# Patient Record
Sex: FEMALE | Race: WHITE | URBAN - METROPOLITAN AREA
[De-identification: names, ages, dates, MRNs, and addresses within clinical notes are randomized per-mention and may not be internally consistent; named-entity substitution may affect disease eponyms.]

---

## 2018-11-20 ENCOUNTER — OUTPATIENT (OUTPATIENT)
Dept: OUTPATIENT SERVICES | Facility: HOSPITAL | Age: 33
LOS: 1 days | End: 2018-11-20
Payer: COMMERCIAL

## 2018-11-20 ENCOUNTER — TRANSCRIPTION ENCOUNTER (OUTPATIENT)
Age: 33
End: 2018-11-20

## 2018-11-20 VITALS
SYSTOLIC BLOOD PRESSURE: 119 MMHG | OXYGEN SATURATION: 98 % | TEMPERATURE: 98 F | HEART RATE: 89 BPM | RESPIRATION RATE: 20 BRPM | DIASTOLIC BLOOD PRESSURE: 72 MMHG | WEIGHT: 188.05 LBS | HEIGHT: 63 IN

## 2018-11-20 DIAGNOSIS — O02.1 MISSED ABORTION: ICD-10-CM

## 2018-11-20 DIAGNOSIS — Z90.89 ACQUIRED ABSENCE OF OTHER ORGANS: Chronic | ICD-10-CM

## 2018-11-20 DIAGNOSIS — Z98.890 OTHER SPECIFIED POSTPROCEDURAL STATES: Chronic | ICD-10-CM

## 2018-11-20 LAB
BLD GP AB SCN SERPL QL: NEGATIVE — SIGNIFICANT CHANGE UP
HCT VFR BLD CALC: 39.4 % — SIGNIFICANT CHANGE UP (ref 34.5–45)
HGB BLD-MCNC: 13.2 G/DL — SIGNIFICANT CHANGE UP (ref 11.5–15.5)
MCHC RBC-ENTMCNC: 29.9 PG — SIGNIFICANT CHANGE UP (ref 27–34)
MCHC RBC-ENTMCNC: 33.5 GM/DL — SIGNIFICANT CHANGE UP (ref 32–36)
MCV RBC AUTO: 89.1 FL — SIGNIFICANT CHANGE UP (ref 80–100)
PLATELET # BLD AUTO: 294 K/UL — SIGNIFICANT CHANGE UP (ref 150–400)
RBC # BLD: 4.42 M/UL — SIGNIFICANT CHANGE UP (ref 3.8–5.2)
RBC # FLD: 13.5 % — SIGNIFICANT CHANGE UP (ref 10.3–14.5)
RH IG SCN BLD-IMP: POSITIVE — SIGNIFICANT CHANGE UP
WBC # BLD: 8.76 K/UL — SIGNIFICANT CHANGE UP (ref 3.8–10.5)
WBC # FLD AUTO: 8.76 K/UL — SIGNIFICANT CHANGE UP (ref 3.8–10.5)

## 2018-11-20 PROCEDURE — 86901 BLOOD TYPING SEROLOGIC RH(D): CPT

## 2018-11-20 PROCEDURE — G0463: CPT

## 2018-11-20 PROCEDURE — 85027 COMPLETE CBC AUTOMATED: CPT

## 2018-11-20 PROCEDURE — 86850 RBC ANTIBODY SCREEN: CPT

## 2018-11-20 PROCEDURE — 86900 BLOOD TYPING SEROLOGIC ABO: CPT

## 2018-11-20 RX ORDER — LIDOCAINE HCL 20 MG/ML
0.2 VIAL (ML) INJECTION ONCE
Qty: 0 | Refills: 0 | Status: DISCONTINUED | OUTPATIENT
Start: 2018-11-21 | End: 2018-12-06

## 2018-11-20 RX ORDER — SODIUM CHLORIDE 9 MG/ML
3 INJECTION INTRAMUSCULAR; INTRAVENOUS; SUBCUTANEOUS EVERY 8 HOURS
Qty: 0 | Refills: 0 | Status: DISCONTINUED | OUTPATIENT
Start: 2018-11-21 | End: 2018-12-06

## 2018-11-20 NOTE — H&P PST ADULT - RS GEN PE MLT RESP DETAILS PC
breath sounds equal/normal/airway patent/respirations non-labored/good air movement/clear to auscultation bilaterally

## 2018-11-20 NOTE — H&P PST ADULT - NSANTHOSAYNRD_GEN_A_CORE
No. SHAHZAD screening performed.  STOP BANG Legend: 0-2 = LOW Risk; 3-4 = INTERMEDIATE Risk; 5-8 = HIGH Risk

## 2018-11-20 NOTE — H&P PST ADULT - HISTORY OF PRESENT ILLNESS
33 yr old female  , 11.5 weeks pregnant , had sonogram that revealed no heart beat , Now coming in for D & C Suction for Missed  on 18.

## 2018-11-21 ENCOUNTER — RESULT REVIEW (OUTPATIENT)
Age: 33
End: 2018-11-21

## 2018-11-21 ENCOUNTER — OUTPATIENT (OUTPATIENT)
Dept: OUTPATIENT SERVICES | Facility: HOSPITAL | Age: 33
LOS: 1 days | End: 2018-11-21
Payer: COMMERCIAL

## 2018-11-21 VITALS
RESPIRATION RATE: 16 BRPM | HEART RATE: 85 BPM | TEMPERATURE: 99 F | DIASTOLIC BLOOD PRESSURE: 69 MMHG | OXYGEN SATURATION: 97 % | SYSTOLIC BLOOD PRESSURE: 117 MMHG

## 2018-11-21 VITALS
HEIGHT: 63 IN | OXYGEN SATURATION: 98 % | HEART RATE: 98 BPM | RESPIRATION RATE: 20 BRPM | DIASTOLIC BLOOD PRESSURE: 79 MMHG | SYSTOLIC BLOOD PRESSURE: 119 MMHG | WEIGHT: 188.05 LBS | TEMPERATURE: 99 F

## 2018-11-21 DIAGNOSIS — Z98.890 OTHER SPECIFIED POSTPROCEDURAL STATES: Chronic | ICD-10-CM

## 2018-11-21 DIAGNOSIS — O02.1 MISSED ABORTION: ICD-10-CM

## 2018-11-21 DIAGNOSIS — Z90.89 ACQUIRED ABSENCE OF OTHER ORGANS: Chronic | ICD-10-CM

## 2018-11-21 PROCEDURE — 88305 TISSUE EXAM BY PATHOLOGIST: CPT

## 2018-11-21 PROCEDURE — 59820 CARE OF MISCARRIAGE: CPT

## 2018-11-21 PROCEDURE — 88305 TISSUE EXAM BY PATHOLOGIST: CPT | Mod: 26

## 2018-11-21 RX ORDER — ONDANSETRON 8 MG/1
4 TABLET, FILM COATED ORAL ONCE
Qty: 0 | Refills: 0 | Status: DISCONTINUED | OUTPATIENT
Start: 2018-11-21 | End: 2018-12-06

## 2018-11-21 RX ORDER — SODIUM CHLORIDE 9 MG/ML
1000 INJECTION, SOLUTION INTRAVENOUS
Qty: 0 | Refills: 0 | Status: DISCONTINUED | OUTPATIENT
Start: 2018-11-21 | End: 2018-12-06

## 2018-11-21 RX ORDER — CELECOXIB 200 MG/1
200 CAPSULE ORAL ONCE
Qty: 0 | Refills: 0 | Status: DISCONTINUED | OUTPATIENT
Start: 2018-11-21 | End: 2018-12-06

## 2018-11-21 RX ORDER — ACETAMINOPHEN 500 MG
1000 TABLET ORAL ONCE
Qty: 0 | Refills: 0 | Status: COMPLETED | OUTPATIENT
Start: 2018-11-21 | End: 2018-11-21

## 2018-11-21 RX ORDER — CELECOXIB 200 MG/1
200 CAPSULE ORAL ONCE
Qty: 0 | Refills: 0 | Status: COMPLETED | OUTPATIENT
Start: 2018-11-21 | End: 2018-11-21

## 2018-11-21 NOTE — BRIEF OPERATIVE NOTE - PROCEDURE
<<-----Click on this checkbox to enter Procedure Suction curettage of uterus  11/21/2018    Active  RAYSHAWN

## 2018-11-21 NOTE — BRIEF OPERATIVE NOTE - COMMENTS
Pt. is a smoker and was bucking during procedure. Once patient started to cook all instruments were immediately removed from uterus. Procedure was continued once pt. was better sedated. Pt. is a smoker and was bucking during procedure. Once patient started to cook all instruments were immediately removed from uterus. Procedure was continued once pt. was better sedated.    Dictation #: [id number]

## 2018-11-21 NOTE — BRIEF OPERATIVE NOTE - OPERATION/FINDINGS
Anteverted 9wk sized uterus. Dilated cervix to 0.5cm prior to starting procedure. Products of conception were removed without difficulty.

## 2018-11-21 NOTE — ASU DISCHARGE PLAN (ADULT/PEDIATRIC). - MEDICATION SUMMARY - MEDICATIONS TO TAKE
I will START or STAY ON the medications listed below when I get home from the hospital:    Tylenol 325 mg oral tablet  -- 2 tab(s) by mouth every 4 hours  -- Indication: For Pain    ibuprofen 600 mg oral tablet  -- 1 tab(s) by mouth every 6 hours  -- Indication: For Pain

## 2018-11-21 NOTE — ASU DISCHARGE PLAN (ADULT/PEDIATRIC). - NOTIFY
Numbness, color, or temperature change to extremity/Unable to Urinate/Excessive Diarrhea/Bleeding that does not stop/Persistent Nausea and Vomiting/GYN Fever>100.4/Increased Irritability or Sluggishness/Swelling that continues/Pain not relieved by Medications/Numbness, tingling/Inability to Tolerate Liquids or Foods

## 2018-11-23 LAB — SURGICAL PATHOLOGY STUDY: SIGNIFICANT CHANGE UP

## 2019-04-28 ENCOUNTER — EMERGENCY (EMERGENCY)
Facility: HOSPITAL | Age: 34
LOS: 1 days | Discharge: HOME | End: 2019-04-28
Attending: EMERGENCY MEDICINE
Payer: COMMERCIAL

## 2019-04-28 VITALS
HEART RATE: 86 BPM | OXYGEN SATURATION: 97 % | TEMPERATURE: 98 F | SYSTOLIC BLOOD PRESSURE: 128 MMHG | DIASTOLIC BLOOD PRESSURE: 88 MMHG | RESPIRATION RATE: 16 BRPM

## 2019-04-28 VITALS
SYSTOLIC BLOOD PRESSURE: 116 MMHG | OXYGEN SATURATION: 97 % | HEART RATE: 101 BPM | TEMPERATURE: 98 F | HEIGHT: 62 IN | DIASTOLIC BLOOD PRESSURE: 77 MMHG | RESPIRATION RATE: 20 BRPM | WEIGHT: 179.9 LBS

## 2019-04-28 DIAGNOSIS — Z90.89 ACQUIRED ABSENCE OF OTHER ORGANS: Chronic | ICD-10-CM

## 2019-04-28 DIAGNOSIS — Z98.890 OTHER SPECIFIED POSTPROCEDURAL STATES: Chronic | ICD-10-CM

## 2019-04-28 LAB
ALBUMIN SERPL ELPH-MCNC: 4 G/DL — SIGNIFICANT CHANGE UP (ref 3.3–5)
ALP SERPL-CCNC: 34 U/L — LOW (ref 40–120)
ALT FLD-CCNC: 24 U/L — SIGNIFICANT CHANGE UP (ref 10–45)
ANION GAP SERPL CALC-SCNC: 13 MMOL/L — SIGNIFICANT CHANGE UP (ref 5–17)
APPEARANCE UR: ABNORMAL
AST SERPL-CCNC: 10 U/L — SIGNIFICANT CHANGE UP (ref 10–40)
BACTERIA # UR AUTO: ABNORMAL
BASOPHILS # BLD AUTO: 0 K/UL — SIGNIFICANT CHANGE UP (ref 0–0.2)
BASOPHILS NFR BLD AUTO: 0.3 % — SIGNIFICANT CHANGE UP (ref 0–2)
BILIRUB SERPL-MCNC: 0.1 MG/DL — LOW (ref 0.2–1.2)
BILIRUB UR-MCNC: NEGATIVE — SIGNIFICANT CHANGE UP
BLD GP AB SCN SERPL QL: NEGATIVE — SIGNIFICANT CHANGE UP
BUN SERPL-MCNC: 6 MG/DL — LOW (ref 7–23)
CALCIUM SERPL-MCNC: 9.4 MG/DL — SIGNIFICANT CHANGE UP (ref 8.4–10.5)
CHLORIDE SERPL-SCNC: 100 MMOL/L — SIGNIFICANT CHANGE UP (ref 96–108)
CO2 SERPL-SCNC: 22 MMOL/L — SIGNIFICANT CHANGE UP (ref 22–31)
COD CRY URNS QL: ABNORMAL
COLOR SPEC: YELLOW — SIGNIFICANT CHANGE UP
COMMENT - URINE: SIGNIFICANT CHANGE UP
CREAT SERPL-MCNC: 0.37 MG/DL — LOW (ref 0.5–1.3)
DIFF PNL FLD: NEGATIVE — SIGNIFICANT CHANGE UP
EOSINOPHIL # BLD AUTO: 0.1 K/UL — SIGNIFICANT CHANGE UP (ref 0–0.5)
EOSINOPHIL NFR BLD AUTO: 1.4 % — SIGNIFICANT CHANGE UP (ref 0–6)
EPI CELLS # UR: 13 — HIGH
GLUCOSE SERPL-MCNC: 85 MG/DL — SIGNIFICANT CHANGE UP (ref 70–99)
GLUCOSE UR QL: NEGATIVE — SIGNIFICANT CHANGE UP
HCG SERPL-ACNC: HIGH MIU/ML
HCT VFR BLD CALC: 43.3 % — SIGNIFICANT CHANGE UP (ref 34.5–45)
HGB BLD-MCNC: 13.9 G/DL — SIGNIFICANT CHANGE UP (ref 11.5–15.5)
HYALINE CASTS # UR AUTO: 2 /LPF — SIGNIFICANT CHANGE UP (ref 0–7)
KETONES UR-MCNC: NEGATIVE — SIGNIFICANT CHANGE UP
LEUKOCYTE ESTERASE UR-ACNC: ABNORMAL
LYMPHOCYTES # BLD AUTO: 1.6 K/UL — SIGNIFICANT CHANGE UP (ref 1–3.3)
LYMPHOCYTES # BLD AUTO: 17.9 % — SIGNIFICANT CHANGE UP (ref 13–44)
MCHC RBC-ENTMCNC: 29.1 PG — SIGNIFICANT CHANGE UP (ref 27–34)
MCHC RBC-ENTMCNC: 32.2 GM/DL — SIGNIFICANT CHANGE UP (ref 32–36)
MCV RBC AUTO: 90.5 FL — SIGNIFICANT CHANGE UP (ref 80–100)
MONOCYTES # BLD AUTO: 0.6 K/UL — SIGNIFICANT CHANGE UP (ref 0–0.9)
MONOCYTES NFR BLD AUTO: 7.3 % — SIGNIFICANT CHANGE UP (ref 2–14)
NEUTROPHILS # BLD AUTO: 6.3 K/UL — SIGNIFICANT CHANGE UP (ref 1.8–7.4)
NEUTROPHILS NFR BLD AUTO: 73.2 % — SIGNIFICANT CHANGE UP (ref 43–77)
NITRITE UR-MCNC: NEGATIVE — SIGNIFICANT CHANGE UP
PH UR: 6.5 — SIGNIFICANT CHANGE UP (ref 5–8)
PLATELET # BLD AUTO: 287 K/UL — SIGNIFICANT CHANGE UP (ref 150–400)
POTASSIUM SERPL-MCNC: 3.9 MMOL/L — SIGNIFICANT CHANGE UP (ref 3.5–5.3)
POTASSIUM SERPL-SCNC: 3.9 MMOL/L — SIGNIFICANT CHANGE UP (ref 3.5–5.3)
PROT SERPL-MCNC: 6.9 G/DL — SIGNIFICANT CHANGE UP (ref 6–8.3)
PROT UR-MCNC: ABNORMAL
RBC # BLD: 4.79 M/UL — SIGNIFICANT CHANGE UP (ref 3.8–5.2)
RBC # FLD: 12.5 % — SIGNIFICANT CHANGE UP (ref 10.3–14.5)
RBC CASTS # UR COMP ASSIST: 1 /HPF — SIGNIFICANT CHANGE UP (ref 0–4)
RH IG SCN BLD-IMP: POSITIVE — SIGNIFICANT CHANGE UP
SODIUM SERPL-SCNC: 135 MMOL/L — SIGNIFICANT CHANGE UP (ref 135–145)
SP GR SPEC: 1.03 — HIGH (ref 1.01–1.02)
UROBILINOGEN FLD QL: NEGATIVE — SIGNIFICANT CHANGE UP
WBC # BLD: 8.7 K/UL — SIGNIFICANT CHANGE UP (ref 3.8–10.5)
WBC # FLD AUTO: 8.7 K/UL — SIGNIFICANT CHANGE UP (ref 3.8–10.5)
WBC UR QL: 140 /HPF — HIGH (ref 0–5)

## 2019-04-28 PROCEDURE — 86901 BLOOD TYPING SEROLOGIC RH(D): CPT

## 2019-04-28 PROCEDURE — 87086 URINE CULTURE/COLONY COUNT: CPT

## 2019-04-28 PROCEDURE — 80053 COMPREHEN METABOLIC PANEL: CPT

## 2019-04-28 PROCEDURE — 85027 COMPLETE CBC AUTOMATED: CPT

## 2019-04-28 PROCEDURE — 81001 URINALYSIS AUTO W/SCOPE: CPT

## 2019-04-28 PROCEDURE — 81025 URINE PREGNANCY TEST: CPT

## 2019-04-28 PROCEDURE — 76815 OB US LIMITED FETUS(S): CPT

## 2019-04-28 PROCEDURE — 76817 TRANSVAGINAL US OBSTETRIC: CPT | Mod: 26

## 2019-04-28 PROCEDURE — 76815 OB US LIMITED FETUS(S): CPT | Mod: 26

## 2019-04-28 PROCEDURE — 86850 RBC ANTIBODY SCREEN: CPT

## 2019-04-28 PROCEDURE — 99284 EMERGENCY DEPT VISIT MOD MDM: CPT | Mod: 25

## 2019-04-28 PROCEDURE — 76817 TRANSVAGINAL US OBSTETRIC: CPT

## 2019-04-28 PROCEDURE — 93975 VASCULAR STUDY: CPT

## 2019-04-28 PROCEDURE — 93975 VASCULAR STUDY: CPT | Mod: 26

## 2019-04-28 PROCEDURE — 99284 EMERGENCY DEPT VISIT MOD MDM: CPT

## 2019-04-28 PROCEDURE — 84702 CHORIONIC GONADOTROPIN TEST: CPT

## 2019-04-28 PROCEDURE — 86900 BLOOD TYPING SEROLOGIC ABO: CPT

## 2019-04-28 RX ORDER — SODIUM CHLORIDE 9 MG/ML
3 INJECTION INTRAMUSCULAR; INTRAVENOUS; SUBCUTANEOUS ONCE
Qty: 0 | Refills: 0 | Status: COMPLETED | OUTPATIENT
Start: 2019-04-28 | End: 2019-04-28

## 2019-04-28 RX ORDER — CEPHALEXIN 500 MG
1 CAPSULE ORAL
Qty: 13 | Refills: 0
Start: 2019-04-28 | End: 2019-05-04

## 2019-04-28 RX ORDER — CEPHALEXIN 500 MG
500 CAPSULE ORAL DAILY
Qty: 0 | Refills: 0 | Status: COMPLETED | OUTPATIENT
Start: 2019-04-28 | End: 2019-04-28

## 2019-04-28 RX ORDER — SODIUM CHLORIDE 9 MG/ML
1000 INJECTION INTRAMUSCULAR; INTRAVENOUS; SUBCUTANEOUS
Qty: 0 | Refills: 0 | Status: COMPLETED | OUTPATIENT
Start: 2019-04-28 | End: 2019-04-28

## 2019-04-28 RX ADMIN — SODIUM CHLORIDE 3 MILLILITER(S): 9 INJECTION INTRAMUSCULAR; INTRAVENOUS; SUBCUTANEOUS at 14:30

## 2019-04-28 RX ADMIN — Medication 500 MILLIGRAM(S): at 17:56

## 2019-04-28 RX ADMIN — SODIUM CHLORIDE 125 MILLILITER(S): 9 INJECTION INTRAMUSCULAR; INTRAVENOUS; SUBCUTANEOUS at 14:30

## 2019-04-28 NOTE — ED PROVIDER NOTE - PSH
S/P rhinoplasty    S/P tonsillectomy and adenoidectomy History of D&C    S/P rhinoplasty    S/P tonsillectomy and adenoidectomy

## 2019-04-28 NOTE — ED PROVIDER NOTE - PLAN OF CARE
To take your medications as prescribed. You were found to have a urinary tract infection. Please continue to take your medication as prescribed and follow up with your OB/GYN

## 2019-04-28 NOTE — ED ADULT NURSE NOTE - OBJECTIVE STATEMENT
33 year old female patient presents ambulatory to ED c/o lower abd cramping and vaginal bleeding yesterday. Patient 11 weeks pregnant, LMP 2/7 with hx of two miscarriages in the past. Patient states she noticed light vaginal spotting yesterday that resolved, associated with 3 days of lower abd cramping. Patient currently denies vaginal bleeding or discharge. Denies current HA, dizziness, palpitations, CP, n/v/d, fever, chills. Patient reports 2/10 lower abd cramping, currently tolerable. Patient well appearing with no acute distress noted, aware of plan of care for US. Family at bedside. VSS.

## 2019-04-28 NOTE — ED PROVIDER NOTE - CLINICAL SUMMARY MEDICAL DECISION MAKING FREE TEXT BOX
Patient is a 32 yo F with PMH significant for 2 miscarriages, now pregnant comes in for vaginal spotting and abdominal pain. Will obtain basic labs, U/A, pelvic and transvaginal u/s

## 2019-04-28 NOTE — ED ADULT TRIAGE NOTE - CHIEF COMPLAINT QUOTE
I'm 11 weeks pregnant and I had some bleeding and pressure yesterday. I've had 2 previous miscarriages.

## 2019-04-28 NOTE — ED PROVIDER NOTE - NS ED ROS FT
REVIEW OF SYSTEMS:  CONSTITUTIONAL: No weakness, fevers or chills  EYES/ENT: No visual changes;  No vertigo or throat pain   NECK: No pain or stiffness  RESPIRATORY: No cough, wheezing, hemoptysis; No shortness of breath  CARDIOVASCULAR: No chest pain or palpitations  GASTROINTESTINAL: No abdominal or epigastric pain. No nausea, or vomiting. No diarrhea or constipation.  GENITOURINARY: No dysuria, frequency or hematuria  NEUROLOGICAL: No numbness or weakness  SKIN: No itching, burning, rashes, or lesions   All other review of systems is negative unless indicated above.

## 2019-04-28 NOTE — ED PROVIDER NOTE - PHYSICAL EXAMINATION
PHYSICAL EXAM:  GENERAL: NAD  HEAD:  Atraumatic, Normocephalic  EYES: EOMI, PERRLA, conjunctiva and sclera clear  ENMT:  Moist mucous membranes  NECK: Supple,d  HEART: Regular rate and rhythm; No murmurs, rubs, or gallops  RESPIRATORY: CTA B/L, No W/R/R  ABDOMEN: Soft, Nontender, Nondistended; Bowel sounds present  NEUROLOGY: A&Ox3, nonfocal, moving all extremities  EXTREMITIES:  No pedal edema  SKIN: warm, dry, normal color, no rash or abnormal lesions PHYSICAL EXAM:  GENERAL: NAD  HEAD:  Atraumatic, Normocephalic  EYES: conjunctiva and sclera clear  ENMT:  Moist mucous membranes  NECK: Supple  HEART: Regular rate and rhythm; No murmurs, rubs, or gallops  RESPIRATORY: CTA B/L, No W/R/R  ABDOMEN: Soft,  Nondistended; Bowel sounds present, mild lower abd tenderness  NEUROLOGY: A&Ox3, nonfocal, moving all extremities  EXTREMITIES:  No pedal edema  SKIN: warm, dry, normal color, no rash or abnormal lesions    DECLINED: PELVIC EXAM

## 2019-04-28 NOTE — ED PROVIDER NOTE - OBJECTIVE STATEMENT
Patient is a 34 yo F with no significant PMH with LMP 3 mo ago ( pregnant) comes in for vaginal spotting yesterday. Reports abdo Patient is a 34 yo F with no significant PMH with LMP 3 mo ago ( pregnant) comes in for vaginal spotting yesterday. Reports slight vaginal spotting since yesterday. Reports an abdominal pulling sensation in the lower quadrant for the past two days. Patient is a 32 yo F with PMH significant for 2 miscarriages, now pregnant comes in for vaginal spotting and abdominal pain. Reports that her  LMP was 3 mo ago. States that yesterday she experienced light vaginal spotting and was told by her OB to report to the ED. Reports lower abdominal cramping  pain for 3-4 days. Denies any increased urination, burning, or blood in urine. Endorses some N and V since the beginning of her pregnancy.

## 2019-04-28 NOTE — ED PROVIDER NOTE - ATTENDING CONTRIBUTION TO CARE
Private Physician [facility]  33y female pmh Neg, NO dm,htn,hld,cancer,cva,mi,cad,habits, PT lmp 3m ago. +pregnant, EDC 11/14, Pregnant 3, live 0, Menarche age 16, regular, NO hx std, SP D&C after first miscarriage. Pt comes to ed complains of vag bleeding. "slight vag spotting" yesterday. Much less than usual menses. Abd "Pulling/tightness" Lower quads, past 3-4 days. Called pmd and referred to ed PE WDWN female awake alert normocephalic atraumatic neck supple cv no rubs, gallops or murmurs Abd soft +bs no mass guarding. Neuro no focal defects.  Alan Chacko MD, Facep

## 2019-04-28 NOTE — ED PROVIDER NOTE - CARE PLAN
Principal Discharge DX:	Urinary tract infection  Goal:	To take your medications as prescribed.  Assessment and plan of treatment:	You were found to have a urinary tract infection. Please continue to take your medication as prescribed and follow up with your OB/GYN  Secondary Diagnosis:	Pregnancy

## 2019-04-28 NOTE — ED ADULT NURSE NOTE - NSIMPLEMENTINTERV_GEN_ALL_ED
Implemented All Universal Safety Interventions:  [place] to call system. Call bell, personal items and telephone within reach. Instruct patient to call for assistance. Room bathroom lighting operational. Non-slip footwear when patient is off stretcher. Physically safe environment: no spills, clutter or unnecessary equipment. Stretcher in lowest position, wheels locked, appropriate side rails in place.

## 2019-04-28 NOTE — ED ADULT NURSE NOTE - CHIEF COMPLAINT QUOTE
I'm 11 weeks pregnant and I had some bleeding and pressure yesterday. I've had 2 previous miscarriages.
(0) independent

## 2019-04-29 LAB
CULTURE RESULTS: SIGNIFICANT CHANGE UP
SPECIMEN SOURCE: SIGNIFICANT CHANGE UP

## 2019-07-05 ENCOUNTER — TRANSCRIPTION ENCOUNTER (OUTPATIENT)
Age: 34
End: 2019-07-05

## 2019-07-06 ENCOUNTER — OUTPATIENT (OUTPATIENT)
Dept: OUTPATIENT SERVICES | Facility: HOSPITAL | Age: 34
LOS: 1 days | Discharge: HOME | End: 2019-07-06
Payer: COMMERCIAL

## 2019-07-06 VITALS
HEART RATE: 82 BPM | TEMPERATURE: 98 F | SYSTOLIC BLOOD PRESSURE: 119 MMHG | RESPIRATION RATE: 18 BRPM | OXYGEN SATURATION: 96 % | DIASTOLIC BLOOD PRESSURE: 73 MMHG

## 2019-07-06 VITALS
SYSTOLIC BLOOD PRESSURE: 122 MMHG | TEMPERATURE: 98 F | DIASTOLIC BLOOD PRESSURE: 77 MMHG | RESPIRATION RATE: 18 BRPM | HEART RATE: 79 BPM

## 2019-07-06 DIAGNOSIS — O34.30 MATERNAL CARE FOR CERVICAL INCOMPETENCE, UNSPECIFIED TRIMESTER: ICD-10-CM

## 2019-07-06 DIAGNOSIS — Z90.89 ACQUIRED ABSENCE OF OTHER ORGANS: Chronic | ICD-10-CM

## 2019-07-06 DIAGNOSIS — Z98.890 OTHER SPECIFIED POSTPROCEDURAL STATES: Chronic | ICD-10-CM

## 2019-07-06 LAB
BLD GP AB SCN SERPL QL: NEGATIVE — SIGNIFICANT CHANGE UP
HCT VFR BLD CALC: 38.1 % — SIGNIFICANT CHANGE UP (ref 34.5–45)
HGB BLD-MCNC: 13.2 G/DL — SIGNIFICANT CHANGE UP (ref 11.5–15.5)
MCHC RBC-ENTMCNC: 31.8 PG — SIGNIFICANT CHANGE UP (ref 27–34)
MCHC RBC-ENTMCNC: 34.7 GM/DL — SIGNIFICANT CHANGE UP (ref 32–36)
MCV RBC AUTO: 91.7 FL — SIGNIFICANT CHANGE UP (ref 80–100)
PLATELET # BLD AUTO: 225 K/UL — SIGNIFICANT CHANGE UP (ref 150–400)
RBC # BLD: 4.16 M/UL — SIGNIFICANT CHANGE UP (ref 3.8–5.2)
RBC # FLD: 12.9 % — SIGNIFICANT CHANGE UP (ref 10.3–14.5)
RH IG SCN BLD-IMP: POSITIVE — SIGNIFICANT CHANGE UP
T PALLIDUM AB TITR SER: NEGATIVE — SIGNIFICANT CHANGE UP
WBC # BLD: 12 K/UL — HIGH (ref 3.8–10.5)
WBC # FLD AUTO: 12 K/UL — HIGH (ref 3.8–10.5)

## 2019-07-06 PROCEDURE — 59320 REVISION OF CERVIX: CPT

## 2019-07-06 PROCEDURE — 85027 COMPLETE CBC AUTOMATED: CPT

## 2019-07-06 PROCEDURE — 86900 BLOOD TYPING SEROLOGIC ABO: CPT

## 2019-07-06 PROCEDURE — 86780 TREPONEMA PALLIDUM: CPT

## 2019-07-06 PROCEDURE — 86850 RBC ANTIBODY SCREEN: CPT

## 2019-07-06 PROCEDURE — 86901 BLOOD TYPING SEROLOGIC RH(D): CPT

## 2019-07-06 RX ORDER — INDOMETHACIN 50 MG
1 CAPSULE ORAL
Qty: 12 | Refills: 0
Start: 2019-07-06 | End: 2019-07-07

## 2019-07-06 RX ORDER — SODIUM CHLORIDE 9 MG/ML
500 INJECTION, SOLUTION INTRAVENOUS ONCE
Refills: 0 | Status: DISCONTINUED | OUTPATIENT
Start: 2019-07-06 | End: 2019-07-21

## 2019-07-06 RX ORDER — SODIUM CHLORIDE 9 MG/ML
500 INJECTION, SOLUTION INTRAVENOUS
Refills: 0 | Status: DISCONTINUED | OUTPATIENT
Start: 2019-07-06 | End: 2019-07-21

## 2019-07-06 RX ORDER — ACETAMINOPHEN 500 MG
975 TABLET ORAL ONCE
Refills: 0 | Status: COMPLETED | OUTPATIENT
Start: 2019-07-06 | End: 2019-07-06

## 2019-07-06 RX ORDER — INDOMETHACIN 50 MG
25 CAPSULE ORAL ONCE
Refills: 0 | Status: COMPLETED | OUTPATIENT
Start: 2019-07-06 | End: 2019-07-06

## 2019-07-06 RX ADMIN — Medication 975 MILLIGRAM(S): at 11:54

## 2019-07-06 RX ADMIN — Medication 25 MILLIGRAM(S): at 11:54

## 2019-07-06 NOTE — BRIEF OPERATIVE NOTE - NSICDXBRIEFPOSTOP_GEN_ALL_CORE_FT
POST-OP DIAGNOSIS:  Cervical insufficiency in pregnancy, antepartum 06-Jul-2019 11:37:44  Anita Rendon

## 2019-07-06 NOTE — BRIEF OPERATIVE NOTE - OPERATION/FINDINGS
Mcrae cerclage placed with [place]il[place] suture, knot secured with silk suture.  Os closed after cerclage. FHR of 148 confirmed post-operatively.

## 2019-07-06 NOTE — BRIEF OPERATIVE NOTE - NSICDXBRIEFPREOP_GEN_ALL_CORE_FT
PRE-OP DIAGNOSIS:  Cervical insufficiency in pregnancy, antepartum 06-Jul-2019 11:37:35  Anita Rendon

## 2019-10-27 ENCOUNTER — TRANSCRIPTION ENCOUNTER (OUTPATIENT)
Age: 34
End: 2019-10-27

## 2019-10-28 ENCOUNTER — INPATIENT (INPATIENT)
Facility: HOSPITAL | Age: 34
LOS: 2 days | Discharge: HOME | End: 2019-10-31
Attending: OBSTETRICS & GYNECOLOGY | Admitting: OBSTETRICS & GYNECOLOGY
Payer: COMMERCIAL

## 2019-10-28 VITALS — WEIGHT: 229.28 LBS | HEIGHT: 62 IN

## 2019-10-28 DIAGNOSIS — Z3A.00 WEEKS OF GESTATION OF PREGNANCY NOT SPECIFIED: ICD-10-CM

## 2019-10-28 DIAGNOSIS — O26.899 OTHER SPECIFIED PREGNANCY RELATED CONDITIONS, UNSPECIFIED TRIMESTER: ICD-10-CM

## 2019-10-28 DIAGNOSIS — Z90.89 ACQUIRED ABSENCE OF OTHER ORGANS: Chronic | ICD-10-CM

## 2019-10-28 DIAGNOSIS — Z98.890 OTHER SPECIFIED POSTPROCEDURAL STATES: Chronic | ICD-10-CM

## 2019-10-28 DIAGNOSIS — Z34.80 ENCOUNTER FOR SUPERVISION OF OTHER NORMAL PREGNANCY, UNSPECIFIED TRIMESTER: ICD-10-CM

## 2019-10-28 LAB
ALBUMIN SERPL ELPH-MCNC: 3.2 G/DL — LOW (ref 3.3–5)
ALP SERPL-CCNC: 87 U/L — SIGNIFICANT CHANGE UP (ref 40–120)
ALT FLD-CCNC: 12 U/L — SIGNIFICANT CHANGE UP (ref 10–45)
ANION GAP SERPL CALC-SCNC: 15 MMOL/L — SIGNIFICANT CHANGE UP (ref 5–17)
APPEARANCE UR: ABNORMAL
APTT BLD: 27.5 SEC — SIGNIFICANT CHANGE UP (ref 27.5–36.3)
AST SERPL-CCNC: 10 U/L — SIGNIFICANT CHANGE UP (ref 10–40)
BASOPHILS # BLD AUTO: 0.02 K/UL — SIGNIFICANT CHANGE UP (ref 0–0.2)
BASOPHILS # BLD AUTO: 0.02 K/UL — SIGNIFICANT CHANGE UP (ref 0–0.2)
BASOPHILS NFR BLD AUTO: 0.2 % — SIGNIFICANT CHANGE UP (ref 0–2)
BASOPHILS NFR BLD AUTO: 0.2 % — SIGNIFICANT CHANGE UP (ref 0–2)
BILIRUB SERPL-MCNC: 0.2 MG/DL — SIGNIFICANT CHANGE UP (ref 0.2–1.2)
BILIRUB UR-MCNC: NEGATIVE — SIGNIFICANT CHANGE UP
BLD GP AB SCN SERPL QL: NEGATIVE — SIGNIFICANT CHANGE UP
BUN SERPL-MCNC: 6 MG/DL — LOW (ref 7–23)
CALCIUM SERPL-MCNC: 9.1 MG/DL — SIGNIFICANT CHANGE UP (ref 8.4–10.5)
CHLORIDE SERPL-SCNC: 103 MMOL/L — SIGNIFICANT CHANGE UP (ref 96–108)
CO2 SERPL-SCNC: 21 MMOL/L — LOW (ref 22–31)
COLOR SPEC: YELLOW — SIGNIFICANT CHANGE UP
CREAT SERPL-MCNC: 0.35 MG/DL — LOW (ref 0.5–1.3)
DIFF PNL FLD: ABNORMAL
EOSINOPHIL # BLD AUTO: 0 K/UL — SIGNIFICANT CHANGE UP (ref 0–0.5)
EOSINOPHIL # BLD AUTO: 0.01 K/UL — SIGNIFICANT CHANGE UP (ref 0–0.5)
EOSINOPHIL NFR BLD AUTO: 0 % — SIGNIFICANT CHANGE UP (ref 0–6)
EOSINOPHIL NFR BLD AUTO: 0.1 % — SIGNIFICANT CHANGE UP (ref 0–6)
FIBRINOGEN PPP-MCNC: 834 MG/DL — HIGH (ref 350–510)
GLUCOSE SERPL-MCNC: 97 MG/DL — SIGNIFICANT CHANGE UP (ref 70–99)
GLUCOSE UR QL: NEGATIVE — SIGNIFICANT CHANGE UP
HCT VFR BLD CALC: 34.3 % — LOW (ref 34.5–45)
HCT VFR BLD CALC: 35.3 % — SIGNIFICANT CHANGE UP (ref 34.5–45)
HGB BLD-MCNC: 11.4 G/DL — LOW (ref 11.5–15.5)
HGB BLD-MCNC: 11.9 G/DL — SIGNIFICANT CHANGE UP (ref 11.5–15.5)
IMM GRANULOCYTES NFR BLD AUTO: 1.1 % — SIGNIFICANT CHANGE UP (ref 0–1.5)
IMM GRANULOCYTES NFR BLD AUTO: 1.2 % — SIGNIFICANT CHANGE UP (ref 0–1.5)
INR BLD: 0.94 RATIO — SIGNIFICANT CHANGE UP (ref 0.88–1.16)
KETONES UR-MCNC: ABNORMAL
LDH SERPL L TO P-CCNC: 121 U/L — SIGNIFICANT CHANGE UP (ref 50–242)
LEUKOCYTE ESTERASE UR-ACNC: ABNORMAL
LYMPHOCYTES # BLD AUTO: 1.15 K/UL — SIGNIFICANT CHANGE UP (ref 1–3.3)
LYMPHOCYTES # BLD AUTO: 1.17 K/UL — SIGNIFICANT CHANGE UP (ref 1–3.3)
LYMPHOCYTES # BLD AUTO: 9 % — LOW (ref 13–44)
LYMPHOCYTES # BLD AUTO: 9.6 % — LOW (ref 13–44)
MCHC RBC-ENTMCNC: 29 PG — SIGNIFICANT CHANGE UP (ref 27–34)
MCHC RBC-ENTMCNC: 29.2 PG — SIGNIFICANT CHANGE UP (ref 27–34)
MCHC RBC-ENTMCNC: 33.2 GM/DL — SIGNIFICANT CHANGE UP (ref 32–36)
MCHC RBC-ENTMCNC: 33.7 GM/DL — SIGNIFICANT CHANGE UP (ref 32–36)
MCV RBC AUTO: 85.9 FL — SIGNIFICANT CHANGE UP (ref 80–100)
MCV RBC AUTO: 87.7 FL — SIGNIFICANT CHANGE UP (ref 80–100)
MONOCYTES # BLD AUTO: 0.68 K/UL — SIGNIFICANT CHANGE UP (ref 0–0.9)
MONOCYTES # BLD AUTO: 0.69 K/UL — SIGNIFICANT CHANGE UP (ref 0–0.9)
MONOCYTES NFR BLD AUTO: 5.3 % — SIGNIFICANT CHANGE UP (ref 2–14)
MONOCYTES NFR BLD AUTO: 5.7 % — SIGNIFICANT CHANGE UP (ref 2–14)
NEUTROPHILS # BLD AUTO: 10.99 K/UL — HIGH (ref 1.8–7.4)
NEUTROPHILS # BLD AUTO: 9.96 K/UL — HIGH (ref 1.8–7.4)
NEUTROPHILS NFR BLD AUTO: 83.2 % — HIGH (ref 43–77)
NEUTROPHILS NFR BLD AUTO: 84.4 % — HIGH (ref 43–77)
NITRITE UR-MCNC: NEGATIVE — SIGNIFICANT CHANGE UP
NRBC # BLD: 0 /100 WBCS — SIGNIFICANT CHANGE UP (ref 0–0)
NRBC # BLD: 0 /100 WBCS — SIGNIFICANT CHANGE UP (ref 0–0)
PH UR: 7 — SIGNIFICANT CHANGE UP (ref 5–8)
PLATELET # BLD AUTO: 252 K/UL — SIGNIFICANT CHANGE UP (ref 150–400)
PLATELET # BLD AUTO: 264 K/UL — SIGNIFICANT CHANGE UP (ref 150–400)
POTASSIUM SERPL-MCNC: 4 MMOL/L — SIGNIFICANT CHANGE UP (ref 3.5–5.3)
POTASSIUM SERPL-SCNC: 4 MMOL/L — SIGNIFICANT CHANGE UP (ref 3.5–5.3)
PROT SERPL-MCNC: 6.3 G/DL — SIGNIFICANT CHANGE UP (ref 6–8.3)
PROT UR-MCNC: ABNORMAL
PROTHROM AB SERPL-ACNC: 10.8 SEC — SIGNIFICANT CHANGE UP (ref 10–12.9)
RBC # BLD: 3.91 M/UL — SIGNIFICANT CHANGE UP (ref 3.8–5.2)
RBC # BLD: 4.11 M/UL — SIGNIFICANT CHANGE UP (ref 3.8–5.2)
RBC # FLD: 14.6 % — HIGH (ref 10.3–14.5)
RBC # FLD: 14.6 % — HIGH (ref 10.3–14.5)
RH IG SCN BLD-IMP: POSITIVE — SIGNIFICANT CHANGE UP
SODIUM SERPL-SCNC: 139 MMOL/L — SIGNIFICANT CHANGE UP (ref 135–145)
SP GR SPEC: 1.03 — HIGH (ref 1.01–1.02)
T PALLIDUM AB TITR SER: NEGATIVE — SIGNIFICANT CHANGE UP
URATE SERPL-MCNC: 3.1 MG/DL — SIGNIFICANT CHANGE UP (ref 2.5–7)
UROBILINOGEN FLD QL: NEGATIVE — SIGNIFICANT CHANGE UP
WBC # BLD: 11.96 K/UL — HIGH (ref 3.8–10.5)
WBC # BLD: 13.01 K/UL — HIGH (ref 3.8–10.5)
WBC # FLD AUTO: 11.96 K/UL — HIGH (ref 3.8–10.5)
WBC # FLD AUTO: 13.01 K/UL — HIGH (ref 3.8–10.5)

## 2019-10-28 RX ORDER — MAGNESIUM HYDROXIDE 400 MG/1
30 TABLET, CHEWABLE ORAL
Refills: 0 | Status: DISCONTINUED | OUTPATIENT
Start: 2019-10-28 | End: 2019-10-31

## 2019-10-28 RX ORDER — FENTANYL/BUPIVACAINE/NS/PF 2MCG/ML-.1
250 PLASTIC BAG, INJECTION (ML) INJECTION
Refills: 0 | Status: DISCONTINUED | OUTPATIENT
Start: 2019-10-28 | End: 2019-10-29

## 2019-10-28 RX ORDER — SODIUM CHLORIDE 9 MG/ML
1000 INJECTION, SOLUTION INTRAVENOUS
Refills: 0 | Status: DISCONTINUED | OUTPATIENT
Start: 2019-10-28 | End: 2019-10-31

## 2019-10-28 RX ORDER — TETANUS TOXOID, REDUCED DIPHTHERIA TOXOID AND ACELLULAR PERTUSSIS VACCINE, ADSORBED 5; 2.5; 8; 8; 2.5 [IU]/.5ML; [IU]/.5ML; UG/.5ML; UG/.5ML; UG/.5ML
0.5 SUSPENSION INTRAMUSCULAR ONCE
Refills: 0 | Status: DISCONTINUED | OUTPATIENT
Start: 2019-10-28 | End: 2019-10-31

## 2019-10-28 RX ORDER — HEPARIN SODIUM 5000 [USP'U]/ML
5000 INJECTION INTRAVENOUS; SUBCUTANEOUS EVERY 12 HOURS
Refills: 0 | Status: DISCONTINUED | OUTPATIENT
Start: 2019-10-28 | End: 2019-10-31

## 2019-10-28 RX ORDER — CITRIC ACID/SODIUM CITRATE 300-500 MG
15 SOLUTION, ORAL ORAL EVERY 6 HOURS
Refills: 0 | Status: DISCONTINUED | OUTPATIENT
Start: 2019-10-28 | End: 2019-10-28

## 2019-10-28 RX ORDER — OXYCODONE HYDROCHLORIDE 5 MG/1
5 TABLET ORAL
Refills: 0 | Status: COMPLETED | OUTPATIENT
Start: 2019-10-28 | End: 2019-11-04

## 2019-10-28 RX ORDER — LANOLIN
1 OINTMENT (GRAM) TOPICAL EVERY 6 HOURS
Refills: 0 | Status: DISCONTINUED | OUTPATIENT
Start: 2019-10-28 | End: 2019-10-31

## 2019-10-28 RX ORDER — OXYTOCIN 10 UNIT/ML
333.33 VIAL (ML) INJECTION
Qty: 20 | Refills: 0 | Status: DISCONTINUED | OUTPATIENT
Start: 2019-10-28 | End: 2019-10-31

## 2019-10-28 RX ORDER — ACETAMINOPHEN 500 MG
975 TABLET ORAL
Refills: 0 | Status: DISCONTINUED | OUTPATIENT
Start: 2019-10-28 | End: 2019-10-31

## 2019-10-28 RX ORDER — SIMETHICONE 80 MG/1
80 TABLET, CHEWABLE ORAL EVERY 4 HOURS
Refills: 0 | Status: DISCONTINUED | OUTPATIENT
Start: 2019-10-28 | End: 2019-10-31

## 2019-10-28 RX ORDER — OXYTOCIN 10 UNIT/ML
4 VIAL (ML) INJECTION
Qty: 30 | Refills: 0 | Status: DISCONTINUED | OUTPATIENT
Start: 2019-10-28 | End: 2019-10-31

## 2019-10-28 RX ORDER — IBUPROFEN 200 MG
600 TABLET ORAL EVERY 6 HOURS
Refills: 0 | Status: COMPLETED | OUTPATIENT
Start: 2019-10-28 | End: 2020-09-25

## 2019-10-28 RX ORDER — OXYTOCIN 10 UNIT/ML
2 VIAL (ML) INJECTION
Qty: 30 | Refills: 0 | Status: DISCONTINUED | OUTPATIENT
Start: 2019-10-28 | End: 2019-10-28

## 2019-10-28 RX ORDER — GLYCERIN ADULT
1 SUPPOSITORY, RECTAL RECTAL AT BEDTIME
Refills: 0 | Status: DISCONTINUED | OUTPATIENT
Start: 2019-10-28 | End: 2019-10-31

## 2019-10-28 RX ORDER — OXYCODONE HYDROCHLORIDE 5 MG/1
5 TABLET ORAL ONCE
Refills: 0 | Status: DISCONTINUED | OUTPATIENT
Start: 2019-10-28 | End: 2019-10-31

## 2019-10-28 RX ORDER — KETOROLAC TROMETHAMINE 30 MG/ML
30 SYRINGE (ML) INJECTION EVERY 6 HOURS
Refills: 0 | Status: COMPLETED | OUTPATIENT
Start: 2019-10-28 | End: 2019-10-30

## 2019-10-28 RX ORDER — ONDANSETRON 8 MG/1
4 TABLET, FILM COATED ORAL EVERY 6 HOURS
Refills: 0 | Status: DISCONTINUED | OUTPATIENT
Start: 2019-10-28 | End: 2019-10-29

## 2019-10-28 RX ORDER — CEFAZOLIN SODIUM 1 G
2000 VIAL (EA) INJECTION EVERY 8 HOURS
Refills: 0 | Status: COMPLETED | OUTPATIENT
Start: 2019-10-28 | End: 2019-10-29

## 2019-10-28 RX ORDER — FENTANYL/BUPIVACAINE/NS/PF 2MCG/ML-.1
5 PLASTIC BAG, INJECTION (ML) INJECTION
Refills: 0 | Status: DISCONTINUED | OUTPATIENT
Start: 2019-10-28 | End: 2019-10-29

## 2019-10-28 RX ORDER — DIPHENHYDRAMINE HCL 50 MG
25 CAPSULE ORAL EVERY 6 HOURS
Refills: 0 | Status: DISCONTINUED | OUTPATIENT
Start: 2019-10-28 | End: 2019-10-31

## 2019-10-28 RX ORDER — SODIUM CHLORIDE 9 MG/ML
1000 INJECTION, SOLUTION INTRAVENOUS
Refills: 0 | Status: DISCONTINUED | OUTPATIENT
Start: 2019-10-28 | End: 2019-10-28

## 2019-10-28 RX ORDER — DEXAMETHASONE 0.5 MG/5ML
4 ELIXIR ORAL EVERY 6 HOURS
Refills: 0 | Status: DISCONTINUED | OUTPATIENT
Start: 2019-10-28 | End: 2019-10-29

## 2019-10-28 RX ORDER — NALOXONE HYDROCHLORIDE 4 MG/.1ML
0.1 SPRAY NASAL
Refills: 0 | Status: DISCONTINUED | OUTPATIENT
Start: 2019-10-28 | End: 2019-10-29

## 2019-10-28 RX ADMIN — Medication 4 MILLIUNIT(S)/MIN: at 07:52

## 2019-10-28 RX ADMIN — Medication 15 MILLILITER(S): at 06:34

## 2019-10-28 RX ADMIN — SODIUM CHLORIDE 125 MILLILITER(S): 9 INJECTION, SOLUTION INTRAVENOUS at 05:35

## 2019-10-28 RX ADMIN — Medication 975 MILLIGRAM(S): at 23:45

## 2019-10-28 RX ADMIN — Medication 250 MILLILITER(S): at 15:30

## 2019-10-28 RX ADMIN — Medication 975 MILLIGRAM(S): at 23:01

## 2019-10-28 RX ADMIN — HEPARIN SODIUM 5000 UNIT(S): 5000 INJECTION INTRAVENOUS; SUBCUTANEOUS at 20:14

## 2019-10-28 RX ADMIN — Medication 5 MILLILITER(S): at 18:52

## 2019-10-28 RX ADMIN — Medication 250 MILLILITER(S): at 17:34

## 2019-10-28 RX ADMIN — Medication 30 MILLIGRAM(S): at 21:00

## 2019-10-28 RX ADMIN — Medication 30 MILLIGRAM(S): at 20:14

## 2019-10-28 RX ADMIN — Medication 100 MILLIGRAM(S): at 23:01

## 2019-10-29 LAB
BASOPHILS # BLD AUTO: 0.02 K/UL — SIGNIFICANT CHANGE UP (ref 0–0.2)
BASOPHILS NFR BLD AUTO: 0.2 % — SIGNIFICANT CHANGE UP (ref 0–2)
EOSINOPHIL # BLD AUTO: 0.02 K/UL — SIGNIFICANT CHANGE UP (ref 0–0.5)
EOSINOPHIL NFR BLD AUTO: 0.2 % — SIGNIFICANT CHANGE UP (ref 0–6)
HCT VFR BLD CALC: 30.4 % — LOW (ref 34.5–45)
HGB BLD-MCNC: 9.7 G/DL — LOW (ref 11.5–15.5)
IMM GRANULOCYTES NFR BLD AUTO: 0.6 % — SIGNIFICANT CHANGE UP (ref 0–1.5)
LYMPHOCYTES # BLD AUTO: 0.87 K/UL — LOW (ref 1–3.3)
LYMPHOCYTES # BLD AUTO: 7.5 % — LOW (ref 13–44)
MCHC RBC-ENTMCNC: 28.4 PG — SIGNIFICANT CHANGE UP (ref 27–34)
MCHC RBC-ENTMCNC: 31.9 GM/DL — LOW (ref 32–36)
MCV RBC AUTO: 89.1 FL — SIGNIFICANT CHANGE UP (ref 80–100)
MONOCYTES # BLD AUTO: 0.84 K/UL — SIGNIFICANT CHANGE UP (ref 0–0.9)
MONOCYTES NFR BLD AUTO: 7.2 % — SIGNIFICANT CHANGE UP (ref 2–14)
NEUTROPHILS # BLD AUTO: 9.81 K/UL — HIGH (ref 1.8–7.4)
NEUTROPHILS NFR BLD AUTO: 84.3 % — HIGH (ref 43–77)
NRBC # BLD: 0 /100 WBCS — SIGNIFICANT CHANGE UP (ref 0–0)
PLATELET # BLD AUTO: 176 K/UL — SIGNIFICANT CHANGE UP (ref 150–400)
RBC # BLD: 3.41 M/UL — LOW (ref 3.8–5.2)
RBC # FLD: 14.5 % — SIGNIFICANT CHANGE UP (ref 10.3–14.5)
WBC # BLD: 11.63 K/UL — HIGH (ref 3.8–10.5)
WBC # FLD AUTO: 11.63 K/UL — HIGH (ref 3.8–10.5)

## 2019-10-29 RX ORDER — OXYCODONE HYDROCHLORIDE 5 MG/1
5 TABLET ORAL ONCE
Refills: 0 | Status: DISCONTINUED | OUTPATIENT
Start: 2019-10-29 | End: 2019-10-31

## 2019-10-29 RX ORDER — OXYCODONE HYDROCHLORIDE 5 MG/1
5 TABLET ORAL
Refills: 0 | Status: DISCONTINUED | OUTPATIENT
Start: 2019-10-29 | End: 2019-10-31

## 2019-10-29 RX ORDER — IBUPROFEN 200 MG
600 TABLET ORAL EVERY 6 HOURS
Refills: 0 | Status: DISCONTINUED | OUTPATIENT
Start: 2019-10-29 | End: 2019-10-31

## 2019-10-29 RX ADMIN — Medication 975 MILLIGRAM(S): at 06:42

## 2019-10-29 RX ADMIN — HEPARIN SODIUM 5000 UNIT(S): 5000 INJECTION INTRAVENOUS; SUBCUTANEOUS at 07:59

## 2019-10-29 RX ADMIN — OXYCODONE HYDROCHLORIDE 5 MILLIGRAM(S): 5 TABLET ORAL at 18:12

## 2019-10-29 RX ADMIN — OXYCODONE HYDROCHLORIDE 5 MILLIGRAM(S): 5 TABLET ORAL at 22:00

## 2019-10-29 RX ADMIN — OXYCODONE HYDROCHLORIDE 5 MILLIGRAM(S): 5 TABLET ORAL at 21:11

## 2019-10-29 RX ADMIN — Medication 100 MILLIGRAM(S): at 05:52

## 2019-10-29 RX ADMIN — Medication 600 MILLIGRAM(S): at 14:01

## 2019-10-29 RX ADMIN — Medication 600 MILLIGRAM(S): at 14:30

## 2019-10-29 RX ADMIN — Medication 600 MILLIGRAM(S): at 21:01

## 2019-10-29 RX ADMIN — Medication 100 MILLIGRAM(S): at 14:20

## 2019-10-29 RX ADMIN — Medication 975 MILLIGRAM(S): at 11:30

## 2019-10-29 RX ADMIN — Medication 600 MILLIGRAM(S): at 20:05

## 2019-10-29 RX ADMIN — Medication 975 MILLIGRAM(S): at 12:15

## 2019-10-29 RX ADMIN — Medication 975 MILLIGRAM(S): at 18:12

## 2019-10-29 RX ADMIN — OXYCODONE HYDROCHLORIDE 5 MILLIGRAM(S): 5 TABLET ORAL at 06:37

## 2019-10-29 RX ADMIN — Medication 30 MILLIGRAM(S): at 07:58

## 2019-10-29 RX ADMIN — Medication 975 MILLIGRAM(S): at 05:52

## 2019-10-29 RX ADMIN — Medication 30 MILLIGRAM(S): at 02:30

## 2019-10-29 RX ADMIN — Medication 30 MILLIGRAM(S): at 01:53

## 2019-10-29 RX ADMIN — OXYCODONE HYDROCHLORIDE 5 MILLIGRAM(S): 5 TABLET ORAL at 11:30

## 2019-10-29 RX ADMIN — HEPARIN SODIUM 5000 UNIT(S): 5000 INJECTION INTRAVENOUS; SUBCUTANEOUS at 20:05

## 2019-10-29 RX ADMIN — Medication 30 MILLIGRAM(S): at 09:00

## 2019-10-29 RX ADMIN — OXYCODONE HYDROCHLORIDE 5 MILLIGRAM(S): 5 TABLET ORAL at 14:03

## 2019-10-29 NOTE — PROGRESS NOTE ADULT - PROBLEM SELECTOR PLAN 1
Increase OOB  Renew IVF  Renew PCEA  Dressing removed  Regular diet  AM CBC - WNL  Continue routine prenatal care

## 2019-10-29 NOTE — PROGRESS NOTE ADULT - SUBJECTIVE AND OBJECTIVE BOX
Day 1 of Anesthesia Pain Management Service    SUBJECTIVE: I'm okay  Pain Scale Score:    [X] Refer to charted pain scores    THERAPY: Epidural Bupivacaine 0.01 % and Fentanyl 3 micrograms/mL     Demand Dose: 3 mL  Lockout: 15 minutes   Continuous Rate:  10 mL    MEDICATIONS  (STANDING):  acetaminophen   Tablet .. 975 milliGRAM(s) Oral <User Schedule>  ceFAZolin   IVPB 2000 milliGRAM(s) IV Intermittent every 8 hours  diphtheria/tetanus/pertussis (acellular) Vaccine (ADAcel) 0.5 milliLiter(s) IntraMuscular once  heparin  Injectable 5000 Unit(s) SubCutaneous every 12 hours  ibuprofen  Tablet. 600 milliGRAM(s) Oral every 6 hours  ketorolac   Injectable 30 milliGRAM(s) IV Push every 6 hours  lactated ringers. 1000 milliLiter(s) (125 mL/Hr) IV Continuous <Continuous>  oxytocin Infusion 333.333 milliUNIT(s)/Min (1000 mL/Hr) IV Continuous <Continuous>  oxytocin Infusion 4 milliUNIT(s)/Min (4 mL/Hr) IV Continuous <Continuous>  oxytocin Infusion 333.333 milliUNIT(s)/Min (1000 mL/Hr) IV Continuous <Continuous>    MEDICATIONS  (PRN):  diphenhydrAMINE 25 milliGRAM(s) Oral every 6 hours PRN Itching  glycerin Suppository - Adult 1 Suppository(s) Rectal at bedtime PRN Constipation  lanolin Ointment 1 Application(s) Topical every 6 hours PRN Sore Nipples  magnesium hydroxide Suspension 30 milliLiter(s) Oral two times a day PRN Constipation  oxyCODONE    IR 5 milliGRAM(s) Oral every 3 hours PRN Moderate to Severe Pain (4-10)  oxyCODONE    IR 5 milliGRAM(s) Oral once PRN Moderate to Severe Pain (4-10)  oxyCODONE    IR 5 milliGRAM(s) Oral once PRN Moderate to Severe Pain (4-10)  simethicone 80 milliGRAM(s) Chew every 4 hours PRN Gas      OBJECTIVE:    Assessment of Epidural Catheter Site: 	    [ ] Dressing intact	[X] Site non-tender	[X] Site without erythema, discharge, edema  [ ] Epidural tubing and connection checked	[X] Gross neurological exam within normal limits  [X] Catheter removed    PT/INR - ( 28 Oct 2019 10:54 )   PT: 10.8 sec;   INR: 0.94 ratio         PTT - ( 28 Oct 2019 10:54 )  PTT:27.5 sec                      9.7    11.63 )-----------( 176      ( 29 Oct 2019 06:16 )             30.4     Vital Signs Last 24 Hrs  T(C): 37.1 (10-29-19 @ 08:40), Max: 37.6 (10-28-19 @ 17:10)  T(F): 98.7 (10-29-19 @ 08:40), Max: 99.7 (10-28-19 @ 17:10)  HR: 95 (10-29-19 @ 08:40) (76 - 114)  BP: 117/78 (10-29-19 @ 08:40) (102/68 - 145/60)  BP(mean): 84 (10-28-19 @ 17:10) (73 - 85)  RR: 18 (10-29-19 @ 08:40) (18 - 28)  SpO2: 97% (10-29-19 @ 08:40) (94% - 98%)      Sedation Score:	[X] Alert	[ ] Drowsy	[ ] Arousable  [ ] Asleep  [ ] Unresponsive    Side Effects:	[X] None	[ ] Nausea	[ ] Vomiting  [ ] Pruritus  		[ ] Weakness  [ ] Numbness  [ ] Other:    ASSESSMENT/ PLAN:    Therapy:                         [ ] Continue   [X] Discontinue   [X] Change to PRN Analgesics    Documentation and Verification of current medications:  [X] Done	[ ] Not done, not eligible, reason:    Comments:

## 2019-10-29 NOTE — PROGRESS NOTE ADULT - SUBJECTIVE AND OBJECTIVE BOX
Postpartum Note-  Section POD#1    Allergies: No Known Allergies    Blood Type  AB+  Rubella immune  RPR Negative    Patient reports poor pain control over night- states epidural was dislodged- improving after PO pain medications. Pt is OOB, tolerating PO, passing flatus. Lochia WNL. Voiding freely    O:  Vital Signs Last 24 Hrs  T(C): 36.9 (29 Oct 2019 05:00), Max: 37.6 (28 Oct 2019 17:10)  T(F): 98.4 (29 Oct 2019 05:00), Max: 99.7 (28 Oct 2019 17:10)  HR: 93 (29 Oct 2019 05:00) (76 - 114)  BP: 102/68 (29 Oct 2019 05:00) (102/68 - 145/60)  BP(mean): 84 (28 Oct 2019 17:10) (73 - 85)  RR: 18 (29 Oct 2019 05:00) (18 - 28)  SpO2: 96% (29 Oct 2019 05:00) (94% - 98%)  I&O's Summary    28 Oct 2019 07:01  -  29 Oct 2019 06:57  --------------------------------------------------------  IN: 2000 mL / OUT: 2075 mL / NET: -75 mL        Gen: NAD  Abdomen: Soft, nontender, non-distended, fundus firm.  Incision: Clean, dry, and intact.  Negative erythema/edema/ecchymosis   Sub Q  Lochia WNL  Ext: PAS in place. Negative Homans B/L    LABS:             9.7    11.63 )-----------( 176      ( 10-29 @ 06:16 )             30.4                11.4   13.01 )-----------( 252      ( 10-28 @ 10:54 )             34.3                11.9   11.96 )-----------( 264      ( 10-28 @ 05:43 )             35.3

## 2019-10-30 ENCOUNTER — TRANSCRIPTION ENCOUNTER (OUTPATIENT)
Age: 34
End: 2019-10-30

## 2019-10-30 RX ORDER — IBUPROFEN 200 MG
1 TABLET ORAL
Qty: 0 | Refills: 0 | DISCHARGE

## 2019-10-30 RX ORDER — SIMETHICONE 80 MG/1
1 TABLET, CHEWABLE ORAL
Qty: 0 | Refills: 0 | DISCHARGE
Start: 2019-10-30

## 2019-10-30 RX ORDER — ACETAMINOPHEN 500 MG
2 TABLET ORAL
Qty: 0 | Refills: 0 | DISCHARGE

## 2019-10-30 RX ORDER — IBUPROFEN 200 MG
1 TABLET ORAL
Qty: 0 | Refills: 0 | DISCHARGE
Start: 2019-10-30

## 2019-10-30 RX ORDER — ACETAMINOPHEN 500 MG
3 TABLET ORAL
Qty: 0 | Refills: 0 | DISCHARGE
Start: 2019-10-30

## 2019-10-30 RX ADMIN — Medication 600 MILLIGRAM(S): at 02:09

## 2019-10-30 RX ADMIN — OXYCODONE HYDROCHLORIDE 5 MILLIGRAM(S): 5 TABLET ORAL at 22:56

## 2019-10-30 RX ADMIN — HEPARIN SODIUM 5000 UNIT(S): 5000 INJECTION INTRAVENOUS; SUBCUTANEOUS at 23:31

## 2019-10-30 RX ADMIN — Medication 600 MILLIGRAM(S): at 09:01

## 2019-10-30 RX ADMIN — OXYCODONE HYDROCHLORIDE 5 MILLIGRAM(S): 5 TABLET ORAL at 06:43

## 2019-10-30 RX ADMIN — Medication 600 MILLIGRAM(S): at 21:01

## 2019-10-30 RX ADMIN — Medication 600 MILLIGRAM(S): at 11:27

## 2019-10-30 RX ADMIN — OXYCODONE HYDROCHLORIDE 5 MILLIGRAM(S): 5 TABLET ORAL at 00:11

## 2019-10-30 RX ADMIN — Medication 975 MILLIGRAM(S): at 06:42

## 2019-10-30 RX ADMIN — OXYCODONE HYDROCHLORIDE 5 MILLIGRAM(S): 5 TABLET ORAL at 11:37

## 2019-10-30 RX ADMIN — Medication 600 MILLIGRAM(S): at 15:17

## 2019-10-30 RX ADMIN — Medication 975 MILLIGRAM(S): at 23:31

## 2019-10-30 RX ADMIN — Medication 975 MILLIGRAM(S): at 00:11

## 2019-10-30 RX ADMIN — Medication 600 MILLIGRAM(S): at 17:25

## 2019-10-30 RX ADMIN — OXYCODONE HYDROCHLORIDE 5 MILLIGRAM(S): 5 TABLET ORAL at 22:26

## 2019-10-30 RX ADMIN — Medication 975 MILLIGRAM(S): at 15:22

## 2019-10-30 RX ADMIN — OXYCODONE HYDROCHLORIDE 5 MILLIGRAM(S): 5 TABLET ORAL at 00:46

## 2019-10-30 RX ADMIN — Medication 975 MILLIGRAM(S): at 00:46

## 2019-10-30 RX ADMIN — Medication 975 MILLIGRAM(S): at 17:26

## 2019-10-30 RX ADMIN — Medication 975 MILLIGRAM(S): at 05:49

## 2019-10-30 RX ADMIN — OXYCODONE HYDROCHLORIDE 5 MILLIGRAM(S): 5 TABLET ORAL at 05:49

## 2019-10-30 RX ADMIN — Medication 975 MILLIGRAM(S): at 11:31

## 2019-10-30 RX ADMIN — Medication 600 MILLIGRAM(S): at 21:31

## 2019-10-30 RX ADMIN — Medication 600 MILLIGRAM(S): at 02:50

## 2019-10-30 RX ADMIN — HEPARIN SODIUM 5000 UNIT(S): 5000 INJECTION INTRAVENOUS; SUBCUTANEOUS at 11:31

## 2019-10-30 NOTE — PROGRESS NOTE ADULT - SUBJECTIVE AND OBJECTIVE BOX
Postpartum Note-  Section POD#2    Allergies    No Known Allergies    Intolerances            S:Patient is a  34y G [id number]    POD#2 S/P C/Sec  Subjective: Patient w/o complaints, pain is controlled.  Pt is OOB, tolerating PO, passing flatus, and voiding. Lochia WNL.     O:  Vital Signs Last 24 Hrs  T(C): 36.9 (30 Oct 2019 05:44), Max: 37.1 (29 Oct 2019 08:40)  T(F): 98.5 (30 Oct 2019 05:44), Max: 98.7 (29 Oct 2019 08:40)  HR: 75 (30 Oct 2019 05:44) (37 - 101)  BP: 135/85 (30 Oct 2019 05:44) (100/67 - 135/85)  BP(mean): --  RR: 18 (30 Oct 2019 05:44) (18 - 18)  SpO2: 99% (29 Oct 2019 21:03) (97% - 99%)      Gen: NAD  Abdomen: Soft, nontender, non distened, fundus firm.  Incision: Clean, dry, and intact.  Negative erythema/edema/ecchymosis.   SubQ  Lochia WNL  Ext: Neg edema, Neg calf tenderness    LABS:                          9.7    11.63 )-----------( 176      ( 29 Oct 2019 06:16 )             30.4

## 2019-10-30 NOTE — DISCHARGE NOTE OB - PATIENT PORTAL LINK FT
You can access the FollowMyHealth Patient Portal offered by [facility] by registering at the following website: [email or web address]. By joining [facility]’s FollowMyHealth portal, you will also be able to view your health information using other applications (apps) compatible with our system.

## 2019-10-30 NOTE — DISCHARGE NOTE OB - CARE PROVIDER_API CALL
Mike Jiménez)  Obstetrics and Gynecology  [address] [place] [address]  Phone: [phone]  Fax: [phone]  Follow Up Time:

## 2019-10-30 NOTE — DISCHARGE NOTE OB - MEDICATION SUMMARY - MEDICATIONS TO TAKE
I will START or STAY ON the medications listed below when I get home from the hospital:    acetaminophen 325 mg oral tablet  -- 3 tab(s) by mouth   -- Indication: For Pain    ibuprofen 600 mg oral tablet  -- 1 tab(s) by mouth every 6 hours  -- Indication: For Pain    simethicone 80 mg oral tablet, chewable  -- 1 tab(s) by mouth every 4 hours, As needed, Gas  -- Indication: For Gas

## 2019-10-30 NOTE — PROGRESS NOTE ADULT - ASSESSMENT
A/P:  34y  POD # 2 S/P  primary  section, doing well    PMHx: MAB D&Cx2, rhinoplasty  Current Issues:

## 2019-10-30 NOTE — DISCHARGE NOTE OB - MEDICATION SUMMARY - MEDICATIONS TO STOP TAKING
I will STOP taking the medications listed below when I get home from the hospital:    Keflex 500 mg oral capsule  -- 1 cap(s) by mouth every 12 hours   -- Finish all this medication unless otherwise directed by prescriber.    indomethacin 25 mg oral capsule  -- 1 cap(s) by mouth every 4 hours   -- Do not take aspirin or aspirin containing products without knowledge and consent of your physician.  It is very important that you take or use this exactly as directed.  Do not skip doses or discontinue unless directed by your doctor.  May cause drowsiness or dizziness.  Obtain medical advice before taking any non-prescription drugs as some may affect the action of this medication.  Take with food or milk.

## 2019-10-30 NOTE — DISCHARGE NOTE OB - CARE PLAN
Principal Discharge DX:	 delivery delivered  Goal:	Routine post partum course  Assessment and plan of treatment:	follow up in 2 weeks

## 2019-10-31 VITALS
DIASTOLIC BLOOD PRESSURE: 82 MMHG | SYSTOLIC BLOOD PRESSURE: 125 MMHG | HEART RATE: 100 BPM | TEMPERATURE: 98 F | OXYGEN SATURATION: 96 % | RESPIRATION RATE: 18 BRPM

## 2019-10-31 LAB
BASOPHILS # BLD AUTO: 0.03 K/UL — SIGNIFICANT CHANGE UP (ref 0–0.2)
BASOPHILS NFR BLD AUTO: 0.4 % — SIGNIFICANT CHANGE UP (ref 0–2)
EOSINOPHIL # BLD AUTO: 0.1 K/UL — SIGNIFICANT CHANGE UP (ref 0–0.5)
EOSINOPHIL NFR BLD AUTO: 1.2 % — SIGNIFICANT CHANGE UP (ref 0–6)
HCT VFR BLD CALC: 28.7 % — LOW (ref 34.5–45)
HGB BLD-MCNC: 9.2 G/DL — LOW (ref 11.5–15.5)
IMM GRANULOCYTES NFR BLD AUTO: 1.6 % — HIGH (ref 0–1.5)
LYMPHOCYTES # BLD AUTO: 0.93 K/UL — LOW (ref 1–3.3)
LYMPHOCYTES # BLD AUTO: 11 % — LOW (ref 13–44)
MCHC RBC-ENTMCNC: 28.6 PG — SIGNIFICANT CHANGE UP (ref 27–34)
MCHC RBC-ENTMCNC: 32.1 GM/DL — SIGNIFICANT CHANGE UP (ref 32–36)
MCV RBC AUTO: 89.1 FL — SIGNIFICANT CHANGE UP (ref 80–100)
MONOCYTES # BLD AUTO: 0.46 K/UL — SIGNIFICANT CHANGE UP (ref 0–0.9)
MONOCYTES NFR BLD AUTO: 5.4 % — SIGNIFICANT CHANGE UP (ref 2–14)
NEUTROPHILS # BLD AUTO: 6.83 K/UL — SIGNIFICANT CHANGE UP (ref 1.8–7.4)
NEUTROPHILS NFR BLD AUTO: 80.4 % — HIGH (ref 43–77)
NRBC # BLD: 0 /100 WBCS — SIGNIFICANT CHANGE UP (ref 0–0)
PLATELET # BLD AUTO: 210 K/UL — SIGNIFICANT CHANGE UP (ref 150–400)
RBC # BLD: 3.22 M/UL — LOW (ref 3.8–5.2)
RBC # FLD: 14.8 % — HIGH (ref 10.3–14.5)
WBC # BLD: 8.49 K/UL — SIGNIFICANT CHANGE UP (ref 3.8–10.5)
WBC # FLD AUTO: 8.49 K/UL — SIGNIFICANT CHANGE UP (ref 3.8–10.5)

## 2019-10-31 PROCEDURE — 59025 FETAL NON-STRESS TEST: CPT

## 2019-10-31 PROCEDURE — 86780 TREPONEMA PALLIDUM: CPT

## 2019-10-31 PROCEDURE — 84550 ASSAY OF BLOOD/URIC ACID: CPT

## 2019-10-31 PROCEDURE — G0463: CPT

## 2019-10-31 PROCEDURE — 83615 LACTATE (LD) (LDH) ENZYME: CPT

## 2019-10-31 PROCEDURE — 85384 FIBRINOGEN ACTIVITY: CPT

## 2019-10-31 PROCEDURE — 85730 THROMBOPLASTIN TIME PARTIAL: CPT

## 2019-10-31 PROCEDURE — 85027 COMPLETE CBC AUTOMATED: CPT

## 2019-10-31 PROCEDURE — 81001 URINALYSIS AUTO W/SCOPE: CPT

## 2019-10-31 PROCEDURE — 59050 FETAL MONITOR W/REPORT: CPT

## 2019-10-31 PROCEDURE — 80053 COMPREHEN METABOLIC PANEL: CPT

## 2019-10-31 PROCEDURE — 85610 PROTHROMBIN TIME: CPT

## 2019-10-31 RX ADMIN — OXYCODONE HYDROCHLORIDE 5 MILLIGRAM(S): 5 TABLET ORAL at 03:49

## 2019-10-31 RX ADMIN — Medication 975 MILLIGRAM(S): at 08:40

## 2019-10-31 RX ADMIN — OXYCODONE HYDROCHLORIDE 5 MILLIGRAM(S): 5 TABLET ORAL at 12:20

## 2019-10-31 RX ADMIN — OXYCODONE HYDROCHLORIDE 5 MILLIGRAM(S): 5 TABLET ORAL at 11:52

## 2019-10-31 RX ADMIN — Medication 975 MILLIGRAM(S): at 08:10

## 2019-10-31 RX ADMIN — Medication 600 MILLIGRAM(S): at 03:47

## 2019-10-31 RX ADMIN — Medication 600 MILLIGRAM(S): at 12:20

## 2019-10-31 RX ADMIN — OXYCODONE HYDROCHLORIDE 5 MILLIGRAM(S): 5 TABLET ORAL at 03:19

## 2019-10-31 RX ADMIN — OXYCODONE HYDROCHLORIDE 5 MILLIGRAM(S): 5 TABLET ORAL at 08:10

## 2019-10-31 RX ADMIN — Medication 600 MILLIGRAM(S): at 11:52

## 2019-10-31 RX ADMIN — OXYCODONE HYDROCHLORIDE 5 MILLIGRAM(S): 5 TABLET ORAL at 08:40

## 2019-10-31 RX ADMIN — Medication 600 MILLIGRAM(S): at 03:17

## 2019-10-31 RX ADMIN — Medication 975 MILLIGRAM(S): at 00:01

## 2019-10-31 NOTE — PROGRESS NOTE ADULT - ASSESSMENT
A/P:  34y  POD # 2 S/P  primary  section, doing well    PMHx: MAB D&Cx2, rhinoplasty  Current Issues: none

## 2019-10-31 NOTE — PROGRESS NOTE ADULT - SUBJECTIVE AND OBJECTIVE BOX
Postpartum Note-  Section POD#3    Allergies    No Known Allergies          Subjective: Patient w/o complaints, pain is controlled.  Pt is OOB, tolerating PO, passing flatus, and voiding. Lochia WNL.     O:  Vital Signs Last 24 Hrs  T(C): 36.3 (31 Oct 2019 05:00), Max: 36.8 (30 Oct 2019 13:22)  T(F): 97.4 (31 Oct 2019 05:00), Max: 98.2 (30 Oct 2019 13:22)  HR: 98 (31 Oct 2019 05:00) (92 - 98)  BP: 126/85 (31 Oct 2019 05:00) (125/79 - 138/86)  BP(mean): --  RR: 18 (31 Oct 2019 05:00) (18 - 18)  SpO2: 97% (31 Oct 2019 05:00) (96% - 97%)    Gen: NAD  Abdomen: Soft, nontender, non distened, fundus firm.  Incision: Clean, dry, and intact.  Negative erythema/edema/ecchymosis.   SubQ  Lochia WNL  Ext: Trace edema, Neg calf tenderness    LABS:                            9.2    8.49  )-----------( 210      ( 31 Oct 2019 06:26 )             28.7                         9.7    11.63 )-----------( 176      ( 29 Oct 2019 06:16 )             30.4

## 2020-05-13 NOTE — ED ADULT NURSE NOTE - CHIEF COMPLAINT
Cooperative   HEENT: Normocephalic. Chest: clear to auscultation bilaterally without wheezes or rhonchi. Cardiac: Normal heart tones with regular rate and rhythm, S1, S2 normal. No murmurs, gallops, or rubs auscultated. Abdomen:soft, non-tender; normal bowel sounds, no masses, no organomegaly. Extremities: No clubbing or cyanosis. No peripheral edema. Peripheral pulses palpable. Neurologic: Grossly intact. Discharge Medications:       Chani [place]   [place] Medication Instructions ERP:[id number]    Printed on:05/13/20 [id number]   Medication Information                      Cyanocobalamin (VITAMIN B 12 PO)  Take by mouth             lidocaine-prilocaine (EMLA) 2.5-2.5 % cream  Apply to port area and cover with plastic wrap one hour prior to use.              lipase-protease-amylase (ZENPEP) 29100-56663 units CPEP delayed release capsule  Take 1-2 capsules by mouth 3 times daily (with meals) Only takes as needed             lipase-protease-amylase (ZENPEP) 5000 units delayed release capsule  Take 1 capsule by mouth 4 times daily (with meals and nightly) Take with meals and snacks for a total of 8 daily             lipase-protease-amylase (ZENPEP) 5000-61988 units CPEP delayed release capsule  Take 1 capsule by mouth 4 times daily (with meals and nightly)             Multiple Vitamins-Minerals (THERAPEUTIC MULTIVITAMIN-MINERALS) tablet  Take 1 tablet by mouth daily             NONFORMULARY  daily Tumeric otc             ondansetron (ZOFRAN) 8 MG tablet  Take 1 tablet by mouth every 8 hours as needed for Nausea or Vomiting             pantoprazole (PROTONIX) 40 MG tablet  Take 1 tablet by mouth 2 times daily             promethazine (PHENERGAN) 25 MG tablet  Take 1 tablet by mouth every 6 hours as needed for Nausea             sucralfate (CARAFATE) 1 GM tablet  Take 1 tablet by mouth 4 times daily             vitamin E 400 UNIT capsule  Take 400 Units by mouth daily                 Discharge Instructions: Follow up with Shani Mcduffie MD in 3-5 days. Take medications as directed. Resume activity as tolerated. Diet: Regular     Disposition: Patient is medically stable and will be discharged to home. Time spent on discharge > 30 minutes.     Signed:  Marley Guallpa DO The patient is a 33y Female complaining of vaginal bleeding.

## 2021-01-05 NOTE — DISCHARGE NOTE OB - YES
PATIENT CALLED AND MADE AWARE OF COVID-19 TESTING NEEDED TO BE DONE WITHIN 96 HOURS OF SURGERY. COVID-19 TESTING APPOINTMENT MADE FOR PATIENT. PATIENT INSTRUCTED ON SELF QUARANTINE BETWEEN TESTING AND ARRIVAL TIME DAY OF SURGERY.
TRANSFER - OUT REPORT:    Verbal report given to TETE RN(name) on Jerri Medrano  being transferred to (unit) for routine post - op       Report consisted of patients Situation, Background, Assessment and   Recommendations(SBAR). Time Pre op antibiotic given:Y  Anesthesia Stop time: Y  Esparza Present on Transfer to floor:N  Order for Esparza on Chart:N  Discharge Prescriptions with Chart:N    Information from the following report(s) SBAR was reviewed with the receiving nurse. Opportunity for questions and clarification was provided. Is the patient on 02? NO       L/Min        Other     Is the patient on a monitor? NO    Is the nurse transporting with the patient? NO    Surgical Waiting Area notified of patient's transfer from PACU? YES      The following personal items collected during your admission accompanied patient upon transfer:   Dental Appliance: Dental Appliances: None  Vision:    Hearing Aid:    Jewelry: Jewelry: None  Clothing: Clothing: Other (comment)(clothes and shoes to pacu)  Other Valuables:  Other Valuables: Eyeglasses, Walker(Glasses and rollator to [employer])  Valuables sent to safe:
Statement Selected

## 2021-03-03 PROBLEM — Z00.00 ENCOUNTER FOR PREVENTIVE HEALTH EXAMINATION: Status: ACTIVE | Noted: 2021-03-03

## 2021-03-09 ENCOUNTER — APPOINTMENT (OUTPATIENT)
Dept: SURGERY | Facility: CLINIC | Age: 36
End: 2021-03-09

## 2021-09-28 NOTE — PATIENT PROFILE OB - AS SC BRADEN MOBILITY
Delayed oral transit time/Reduced anterior - posterior transport/Uncontrolled bolus / spillover in jose-pharynx (4) no limitation

## 2023-08-07 NOTE — ASU PATIENT PROFILE, ADULT - MENTAL HEALTH CONDITIONS/SYMPTOMS, PROFILE
Kelsie Xavier, a 62 y.o. female presents to the ED via EMS w/ complaint of bilateral knee pain described as burning this morning. Pt reports she was given naproxen at [facility] with some relief. Pt denies any other s/s. Pt is AAOX4 and NADN.    none

## 2024-06-30 ENCOUNTER — NON-APPOINTMENT (OUTPATIENT)
Age: 39
End: 2024-06-30

## 2024-07-17 ENCOUNTER — EMERGENCY (EMERGENCY)
Facility: HOSPITAL | Age: 39
LOS: 1 days | Discharge: HOME | End: 2024-07-17
Attending: STUDENT IN AN ORGANIZED HEALTH CARE EDUCATION/TRAINING PROGRAM
Payer: COMMERCIAL

## 2024-07-17 VITALS
HEART RATE: 82 BPM | OXYGEN SATURATION: 99 % | SYSTOLIC BLOOD PRESSURE: 123 MMHG | TEMPERATURE: 98 F | DIASTOLIC BLOOD PRESSURE: 78 MMHG | RESPIRATION RATE: 18 BRPM

## 2024-07-17 VITALS
WEIGHT: 179.9 LBS | DIASTOLIC BLOOD PRESSURE: 87 MMHG | TEMPERATURE: 98 F | HEART RATE: 103 BPM | OXYGEN SATURATION: 98 % | RESPIRATION RATE: 20 BRPM | HEIGHT: 62 IN | SYSTOLIC BLOOD PRESSURE: 135 MMHG

## 2024-07-17 DIAGNOSIS — Z90.89 ACQUIRED ABSENCE OF OTHER ORGANS: Chronic | ICD-10-CM

## 2024-07-17 DIAGNOSIS — Z98.890 OTHER SPECIFIED POSTPROCEDURAL STATES: Chronic | ICD-10-CM

## 2024-07-17 LAB
ALBUMIN SERPL ELPH-MCNC: 4.4 G/DL — SIGNIFICANT CHANGE UP (ref 3.3–5)
ALP SERPL-CCNC: 55 U/L — SIGNIFICANT CHANGE UP (ref 40–120)
ALT FLD-CCNC: 18 U/L — SIGNIFICANT CHANGE UP (ref 10–45)
ANION GAP SERPL CALC-SCNC: 15 MMOL/L — SIGNIFICANT CHANGE UP (ref 5–17)
AST SERPL-CCNC: 12 U/L — SIGNIFICANT CHANGE UP (ref 10–40)
BILIRUB SERPL-MCNC: 0.3 MG/DL — SIGNIFICANT CHANGE UP (ref 0.2–1.2)
BUN SERPL-MCNC: 10 MG/DL — SIGNIFICANT CHANGE UP (ref 7–23)
CALCIUM SERPL-MCNC: 9.6 MG/DL — SIGNIFICANT CHANGE UP (ref 8.4–10.5)
CHLORIDE SERPL-SCNC: 101 MMOL/L — SIGNIFICANT CHANGE UP (ref 96–108)
CO2 SERPL-SCNC: 23 MMOL/L — SIGNIFICANT CHANGE UP (ref 22–31)
CREAT SERPL-MCNC: 0.62 MG/DL — SIGNIFICANT CHANGE UP (ref 0.5–1.3)
EGFR: 118 ML/MIN/1.73M2 — SIGNIFICANT CHANGE UP
GLUCOSE SERPL-MCNC: 94 MG/DL — SIGNIFICANT CHANGE UP (ref 70–99)
HCG SERPL-ACNC: <2 MIU/ML — SIGNIFICANT CHANGE UP
HCT VFR BLD CALC: 44.6 % — SIGNIFICANT CHANGE UP (ref 34.5–45)
HGB BLD-MCNC: 14.7 G/DL — SIGNIFICANT CHANGE UP (ref 11.5–15.5)
MCHC RBC-ENTMCNC: 30.7 PG — SIGNIFICANT CHANGE UP (ref 27–34)
MCHC RBC-ENTMCNC: 33 GM/DL — SIGNIFICANT CHANGE UP (ref 32–36)
MCV RBC AUTO: 93.1 FL — SIGNIFICANT CHANGE UP (ref 80–100)
NRBC # BLD: 0 /100 WBCS — SIGNIFICANT CHANGE UP (ref 0–0)
PLATELET # BLD AUTO: 257 K/UL — SIGNIFICANT CHANGE UP (ref 150–400)
POTASSIUM SERPL-MCNC: 3.4 MMOL/L — LOW (ref 3.5–5.3)
POTASSIUM SERPL-SCNC: 3.4 MMOL/L — LOW (ref 3.5–5.3)
PROT SERPL-MCNC: 7.3 G/DL — SIGNIFICANT CHANGE UP (ref 6–8.3)
RBC # BLD: 4.79 M/UL — SIGNIFICANT CHANGE UP (ref 3.8–5.2)
RBC # FLD: 12.6 % — SIGNIFICANT CHANGE UP (ref 10.3–14.5)
SODIUM SERPL-SCNC: 139 MMOL/L — SIGNIFICANT CHANGE UP (ref 135–145)
TROPONIN T, HIGH SENSITIVITY RESULT: <6 NG/L — SIGNIFICANT CHANGE UP (ref 0–51)
WBC # BLD: 8.18 K/UL — SIGNIFICANT CHANGE UP (ref 3.8–10.5)
WBC # FLD AUTO: 8.18 K/UL — SIGNIFICANT CHANGE UP (ref 3.8–10.5)

## 2024-07-17 PROCEDURE — 85027 COMPLETE CBC AUTOMATED: CPT

## 2024-07-17 PROCEDURE — 99285 EMERGENCY DEPT VISIT HI MDM: CPT

## 2024-07-17 PROCEDURE — 93005 ELECTROCARDIOGRAM TRACING: CPT

## 2024-07-17 PROCEDURE — 84484 ASSAY OF TROPONIN QUANT: CPT

## 2024-07-17 PROCEDURE — 84702 CHORIONIC GONADOTROPIN TEST: CPT

## 2024-07-17 PROCEDURE — 80053 COMPREHEN METABOLIC PANEL: CPT

## 2024-07-17 PROCEDURE — 99285 EMERGENCY DEPT VISIT HI MDM: CPT | Mod: 25

## 2024-07-17 PROCEDURE — 71045 X-RAY EXAM CHEST 1 VIEW: CPT | Mod: 26

## 2024-07-17 PROCEDURE — 71045 X-RAY EXAM CHEST 1 VIEW: CPT

## 2024-07-17 RX ADMIN — Medication 400 MILLIGRAM(S): at 12:45
